# Patient Record
Sex: MALE | Race: WHITE | NOT HISPANIC OR LATINO | Employment: OTHER | URBAN - METROPOLITAN AREA
[De-identification: names, ages, dates, MRNs, and addresses within clinical notes are randomized per-mention and may not be internally consistent; named-entity substitution may affect disease eponyms.]

---

## 2017-05-17 ENCOUNTER — ALLSCRIPTS OFFICE VISIT (OUTPATIENT)
Dept: OTHER | Facility: OTHER | Age: 32
End: 2017-05-17

## 2017-08-17 ENCOUNTER — APPOINTMENT (OUTPATIENT)
Dept: LAB | Facility: CLINIC | Age: 32
End: 2017-08-17
Payer: MEDICARE

## 2017-08-17 ENCOUNTER — ALLSCRIPTS OFFICE VISIT (OUTPATIENT)
Dept: OTHER | Facility: OTHER | Age: 32
End: 2017-08-17

## 2017-08-17 DIAGNOSIS — R73.03 PREDIABETES: ICD-10-CM

## 2017-08-17 DIAGNOSIS — E78.5 HYPERLIPIDEMIA: ICD-10-CM

## 2017-08-17 LAB
ALBUMIN SERPL BCP-MCNC: 3.9 G/DL (ref 3.5–5)
ALP SERPL-CCNC: 70 U/L (ref 46–116)
ALT SERPL W P-5'-P-CCNC: 63 U/L (ref 12–78)
ANION GAP SERPL CALCULATED.3IONS-SCNC: 7 MMOL/L (ref 4–13)
AST SERPL W P-5'-P-CCNC: 35 U/L (ref 5–45)
BILIRUB SERPL-MCNC: 0.73 MG/DL (ref 0.2–1)
BUN SERPL-MCNC: 12 MG/DL (ref 5–25)
CALCIUM SERPL-MCNC: 9.5 MG/DL (ref 8.3–10.1)
CHLORIDE SERPL-SCNC: 104 MMOL/L (ref 100–108)
CHOLEST SERPL-MCNC: 283 MG/DL (ref 50–200)
CO2 SERPL-SCNC: 27 MMOL/L (ref 21–32)
CREAT SERPL-MCNC: 0.92 MG/DL (ref 0.6–1.3)
EST. AVERAGE GLUCOSE BLD GHB EST-MCNC: 117 MG/DL
GFR SERPL CREATININE-BSD FRML MDRD: 108 ML/MIN/1.73SQ M
GLUCOSE P FAST SERPL-MCNC: 89 MG/DL (ref 65–99)
HBA1C MFR BLD: 5.7 % (ref 4.2–6.3)
HDLC SERPL-MCNC: 39 MG/DL (ref 40–60)
LDLC SERPL CALC-MCNC: 176 MG/DL (ref 0–100)
POTASSIUM SERPL-SCNC: 3.9 MMOL/L (ref 3.5–5.3)
PROT SERPL-MCNC: 7.3 G/DL (ref 6.4–8.2)
SODIUM SERPL-SCNC: 138 MMOL/L (ref 136–145)
TRIGL SERPL-MCNC: 342 MG/DL

## 2017-08-17 PROCEDURE — 36415 COLL VENOUS BLD VENIPUNCTURE: CPT

## 2017-08-17 PROCEDURE — 80061 LIPID PANEL: CPT

## 2017-08-17 PROCEDURE — 83036 HEMOGLOBIN GLYCOSYLATED A1C: CPT

## 2017-08-17 PROCEDURE — 80053 COMPREHEN METABOLIC PANEL: CPT

## 2017-08-25 ENCOUNTER — GENERIC CONVERSION - ENCOUNTER (OUTPATIENT)
Dept: OTHER | Facility: OTHER | Age: 32
End: 2017-08-25

## 2017-08-25 DIAGNOSIS — E78.5 HYPERLIPIDEMIA: ICD-10-CM

## 2017-09-20 ENCOUNTER — TRANSCRIBE ORDERS (OUTPATIENT)
Dept: LAB | Facility: CLINIC | Age: 32
End: 2017-09-20

## 2017-09-20 ENCOUNTER — APPOINTMENT (OUTPATIENT)
Dept: LAB | Facility: CLINIC | Age: 32
End: 2017-09-20
Payer: MEDICARE

## 2017-09-20 DIAGNOSIS — E78.5 HYPERLIPIDEMIA: ICD-10-CM

## 2017-09-20 LAB
ALBUMIN SERPL BCP-MCNC: 3.8 G/DL (ref 3.5–5)
ALP SERPL-CCNC: 77 U/L (ref 46–116)
ALT SERPL W P-5'-P-CCNC: 79 U/L (ref 12–78)
ANION GAP SERPL CALCULATED.3IONS-SCNC: 7 MMOL/L (ref 4–13)
AST SERPL W P-5'-P-CCNC: 36 U/L (ref 5–45)
BILIRUB DIRECT SERPL-MCNC: 0.28 MG/DL (ref 0–0.2)
BILIRUB SERPL-MCNC: 1.44 MG/DL (ref 0.2–1)
BUN SERPL-MCNC: 14 MG/DL (ref 5–25)
CALCIUM SERPL-MCNC: 9.4 MG/DL (ref 8.3–10.1)
CHLORIDE SERPL-SCNC: 103 MMOL/L (ref 100–108)
CHOLEST SERPL-MCNC: 177 MG/DL (ref 50–200)
CO2 SERPL-SCNC: 26 MMOL/L (ref 21–32)
CREAT SERPL-MCNC: 0.84 MG/DL (ref 0.6–1.3)
GFR SERPL CREATININE-BSD FRML MDRD: 114 ML/MIN/1.73SQ M
GLUCOSE P FAST SERPL-MCNC: 101 MG/DL (ref 65–99)
HDLC SERPL-MCNC: 44 MG/DL (ref 40–60)
LDLC SERPL CALC-MCNC: 94 MG/DL (ref 0–100)
POTASSIUM SERPL-SCNC: 3.8 MMOL/L (ref 3.5–5.3)
PROT SERPL-MCNC: 7.2 G/DL (ref 6.4–8.2)
SODIUM SERPL-SCNC: 136 MMOL/L (ref 136–145)
TRIGL SERPL-MCNC: 197 MG/DL

## 2017-09-20 PROCEDURE — 80053 COMPREHEN METABOLIC PANEL: CPT

## 2017-09-20 PROCEDURE — 36415 COLL VENOUS BLD VENIPUNCTURE: CPT

## 2017-09-20 PROCEDURE — 80061 LIPID PANEL: CPT

## 2017-09-20 PROCEDURE — 82248 BILIRUBIN DIRECT: CPT

## 2017-10-24 ENCOUNTER — GENERIC CONVERSION - ENCOUNTER (OUTPATIENT)
Dept: OTHER | Facility: OTHER | Age: 32
End: 2017-10-24

## 2017-12-15 ENCOUNTER — GENERIC CONVERSION - ENCOUNTER (OUTPATIENT)
Dept: OTHER | Facility: OTHER | Age: 32
End: 2017-12-15

## 2017-12-15 ENCOUNTER — ALLSCRIPTS OFFICE VISIT (OUTPATIENT)
Dept: OTHER | Facility: OTHER | Age: 32
End: 2017-12-15

## 2017-12-15 DIAGNOSIS — R73.03 PREDIABETES: ICD-10-CM

## 2017-12-15 DIAGNOSIS — E78.5 HYPERLIPIDEMIA: ICD-10-CM
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
## 2018-01-13 VITALS
TEMPERATURE: 99 F | HEART RATE: 99 BPM | WEIGHT: 227.38 LBS | HEIGHT: 70 IN | RESPIRATION RATE: 16 BRPM | OXYGEN SATURATION: 97 % | DIASTOLIC BLOOD PRESSURE: 80 MMHG | SYSTOLIC BLOOD PRESSURE: 110 MMHG | BODY MASS INDEX: 32.55 KG/M2
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
## 2018-01-14 VITALS
RESPIRATION RATE: 16 BRPM | DIASTOLIC BLOOD PRESSURE: 80 MMHG | HEIGHT: 70 IN | HEART RATE: 89 BPM | WEIGHT: 241 LBS | SYSTOLIC BLOOD PRESSURE: 110 MMHG | TEMPERATURE: 97.6 F | OXYGEN SATURATION: 95 % | BODY MASS INDEX: 34.5 KG/M2

## 2018-01-16 NOTE — RESULT NOTES
Discussion/Summary   Discussed results iwth patient's POA/father  Cholesterol is still too high, recommend starting statin  Side effects reviewed  A1c slightly improve dto 5 7 but is still prediabetic  Verified Results  (1) LIPID PANEL FASTING W DIRECT LDL REFLEX 32ECB7493 09:59AM Feng Durbin   TW Order Number: [id number]     Test Name Result Flag Reference   CHOLESTEROL 283 mg/dL H    LDL CHOLESTEROL CALCULATED 176 mg/dL H 0-100   - Patient Instructions: This is a fasting blood test  Water, black tea or black coffee only after 9:00pm the night before test   Drink 2 glasses of water the morning of test       Triglyceride:        Normal ??? ??? ??? ??? ??? ??? ??? <150 mg/dl   ??? ??? ???Borderline High ??? ??? 150-199 mg/dl   ??? ??? ? ?? High ??? ??? ??? ??? ??? ??? ??? 200-499 mg/dl   ??? ??? ? ??Very High ??? ??? ??? ??? ??? >499 mg/dl      Cholesterol:       Desirable ??? ??? ??? ??? <200 mg/dl   ??? ??? Borderline High ??? 200-239 mg/dl   ??? ??? High ??? ??? ??? ??? ??? ??? >239 mg/dl      HDL Cholesterol:       High ??? ???>59 mg/dL   ??? ??? Low ??? ??? <41 mg/dL      HDL Cholesterol:       High ??? ???>59 mg/dL   ??? ??? Low ??? ??? <41 mg/dL      This screening LDL is a calculated result  It does not have the accuracy of the Direct Measured LDL in the monitoring of patients with hyperlipidemia and/or statin therapy  Direct Measure LDL (LFS600) must be ordered separately in these patients  TRIGLYCERIDES 342 mg/dL H <=150   Specimen collection should occur prior to N-Acetylcysteine or Metamizole administration due to the potential for falsely depressed results  HDL,DIRECT 39 mg/dL L 40-60   Specimen collection should occur prior to Metamizole administration due to the potential for falsley depressed results  (1) HEMOGLOBIN A1C 38Tzh3813 09:59AM Feng GRUBBS Order Number: [id number]     Test Name Result Flag Reference   HEMOGLOBIN A1C 5 7 %  4 2-6 3   EST  AVG  GLUCOSE 117 mg/dl       (1) COMPREHENSIVE METABOLIC PANEL 55ABX0483 01:69YV Isa Stuart   TW Order Number: [id number]     Test Name Result Flag Reference   SODIUM 138 mmol/L  136-145   POTASSIUM 3 9 mmol/L  3 5-5 3   CHLORIDE 104 mmol/L  100-108   CARBON DIOXIDE 27 mmol/L  21-32   ANION GAP (CALC) 7 mmol/L  4-13   BLOOD UREA NITROGEN 12 mg/dL  5-25   CREATININE 0 92 mg/dL  0 60-1 30   Standardized to IDMS reference method   CALCIUM 9 5 mg/dL  8 3-10 1   BILI, TOTAL 0 73 mg/dL  0 20-1 00   ALK PHOSPHATAS 70 U/L     ALT (SGPT) 63 U/L  12-78   Specimen collection should occur prior to Sulfasalazine and/or Sulfapyridine administration due to the potential for falsely depressed results  AST(SGOT) 35 U/L  5-45   Specimen collection should occur prior to Sulfasalazine administration due to the potential for falsely depressed results  ALBUMIN 3 9 g/dL  3 5-5 0   TOTAL PROTEIN 7 3 g/dL  6 4-8 2   eGFR 108 ml/min/1 73sq m     [facility] Disease Education Program recommendations are as follows:  GFR calculation is accurate only with a steady state creatinine  Chronic Kidney disease less than 60 ml/min/1 73 sq  meters  Kidney failure less than 15 ml/min/1 73 sq  meters  GLUCOSE FASTING 89 mg/dL  65-99   Specimen collection should occur prior to Sulfasalazine administration due to the potential for falsely depressed results  Specimen collection should occur prior to Sulfapyridine administration due to the potential for falsely elevated results  Plan  Hyperlipidemia    · Rosuvastatin Calcium 10 MG Oral Tablet; Take 1 tablet daily   · (1) COMPREHENSIVE METABOLIC PANEL; Status:Active; Requested for:42Voz4955;    · (1) HEPATIC FUNCTION PANEL; Status:Active; Requested for:25Tzt1463;    · (1) LIPID PANEL FASTING W DIRECT LDL REFLEX; Status:Active;  Requested  for:45Wdk5219;     start rosuvastatin 10mg daily, LFTs in 3 weeks and repeat lipid panel in 4 months prior to next appointment Signatures   Electronically signed by : Braulio Malik DO; Aug 25 2017 10:36AM EST                       (Author)

## 2018-01-22 VITALS
OXYGEN SATURATION: 96 % | TEMPERATURE: 98.1 F | BODY MASS INDEX: 34.65 KG/M2 | DIASTOLIC BLOOD PRESSURE: 80 MMHG | WEIGHT: 242 LBS | SYSTOLIC BLOOD PRESSURE: 112 MMHG | RESPIRATION RATE: 16 BRPM | HEART RATE: 100 BPM | HEIGHT: 70 IN

## 2018-01-22 VITALS
HEART RATE: 90 BPM | BODY MASS INDEX: 35.68 KG/M2 | DIASTOLIC BLOOD PRESSURE: 80 MMHG | OXYGEN SATURATION: 96 % | TEMPERATURE: 97.5 F | SYSTOLIC BLOOD PRESSURE: 110 MMHG | HEIGHT: 70 IN | RESPIRATION RATE: 16 BRPM | WEIGHT: 249.25 LBS
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
## 2018-04-18 ENCOUNTER — APPOINTMENT (OUTPATIENT)
Dept: LAB | Facility: CLINIC | Age: 33
End: 2018-04-18
Payer: MEDICARE

## 2018-04-18 DIAGNOSIS — R73.03 PREDIABETES: ICD-10-CM

## 2018-04-18 DIAGNOSIS — E78.5 HYPERLIPIDEMIA: ICD-10-CM

## 2018-04-18 LAB
ALBUMIN SERPL BCP-MCNC: 4 G/DL (ref 3.5–5)
ALP SERPL-CCNC: 68 U/L (ref 46–116)
ALT SERPL W P-5'-P-CCNC: 60 U/L (ref 12–78)
ANION GAP SERPL CALCULATED.3IONS-SCNC: 7 MMOL/L (ref 4–13)
AST SERPL W P-5'-P-CCNC: 30 U/L (ref 5–45)
BILIRUB SERPL-MCNC: 1.09 MG/DL (ref 0.2–1)
BUN SERPL-MCNC: 11 MG/DL (ref 5–25)
CALCIUM SERPL-MCNC: 9.8 MG/DL (ref 8.3–10.1)
CHLORIDE SERPL-SCNC: 104 MMOL/L (ref 100–108)
CHOLEST SERPL-MCNC: 252 MG/DL (ref 50–200)
CO2 SERPL-SCNC: 28 MMOL/L (ref 21–32)
CREAT SERPL-MCNC: 0.96 MG/DL (ref 0.6–1.3)
GFR SERPL CREATININE-BSD FRML MDRD: 103 ML/MIN/1.73SQ M
GLUCOSE P FAST SERPL-MCNC: 90 MG/DL (ref 65–99)
HDLC SERPL-MCNC: 34 MG/DL (ref 40–60)
LDLC SERPL DIRECT ASSAY-MCNC: 173 MG/DL (ref 0–100)
POTASSIUM SERPL-SCNC: 3.9 MMOL/L (ref 3.5–5.3)
PROT SERPL-MCNC: 7.5 G/DL (ref 6.4–8.2)
SODIUM SERPL-SCNC: 139 MMOL/L (ref 136–145)
TRIGL SERPL-MCNC: 413 MG/DL
TSH SERPL DL<=0.05 MIU/L-ACNC: 2.24 UIU/ML (ref 0.36–3.74)

## 2018-04-18 PROCEDURE — 84443 ASSAY THYROID STIM HORMONE: CPT

## 2018-04-18 PROCEDURE — 36415 COLL VENOUS BLD VENIPUNCTURE: CPT

## 2018-04-18 PROCEDURE — 83721 ASSAY OF BLOOD LIPOPROTEIN: CPT

## 2018-04-18 PROCEDURE — 80053 COMPREHEN METABOLIC PANEL: CPT

## 2018-04-18 PROCEDURE — 80061 LIPID PANEL: CPT

## 2018-04-18 PROCEDURE — 83036 HEMOGLOBIN GLYCOSYLATED A1C: CPT

## 2018-04-19 LAB
EST. AVERAGE GLUCOSE BLD GHB EST-MCNC: 117 MG/DL
HBA1C MFR BLD: 5.7 % (ref 4.2–6.3)

## 2018-04-27 ENCOUNTER — OFFICE VISIT (OUTPATIENT)
Dept: INTERNAL MEDICINE CLINIC | Facility: CLINIC | Age: 33
End: 2018-04-27
Payer: MEDICARE

## 2018-04-27 VITALS
OXYGEN SATURATION: 96 % | RESPIRATION RATE: 16 BRPM | HEIGHT: 70 IN | HEART RATE: 94 BPM | DIASTOLIC BLOOD PRESSURE: 90 MMHG | WEIGHT: 256 LBS | SYSTOLIC BLOOD PRESSURE: 118 MMHG | TEMPERATURE: 97.4 F | BODY MASS INDEX: 36.65 KG/M2

## 2018-04-27 DIAGNOSIS — E78.2 MIXED HYPERLIPIDEMIA: Primary | ICD-10-CM

## 2018-04-27 DIAGNOSIS — R73.03 PREDIABETES: ICD-10-CM

## 2018-04-27 PROBLEM — S06.9X9A TRAUMATIC BRAIN INJURY (HCC): Status: ACTIVE | Noted: 2017-03-02

## 2018-04-27 PROBLEM — S06.9XAA TRAUMATIC BRAIN INJURY (HCC): Status: ACTIVE | Noted: 2017-03-02

## 2018-04-27 PROBLEM — F33.9 MDD (MAJOR DEPRESSIVE DISORDER), RECURRENT EPISODE (HCC): Status: ACTIVE | Noted: 2017-05-17

## 2018-04-27 PROCEDURE — 99213 OFFICE O/P EST LOW 20 MIN: CPT | Performed by: INTERNAL MEDICINE

## 2018-04-27 RX ORDER — ROSUVASTATIN CALCIUM 10 MG/1
1 TABLET, COATED ORAL DAILY
COMMUNITY
Start: 2017-08-25 | End: 2019-05-10 | Stop reason: SDUPTHER

## 2018-04-27 RX ORDER — FLUTICASONE PROPIONATE 50 MCG
2 SPRAY, SUSPENSION (ML) NASAL DAILY
COMMUNITY
Start: 2016-09-21

## 2018-04-27 NOTE — ASSESSMENT & PLAN NOTE
He has been noncompliant with diet and his medications  Stressed importance of taking rosuvastatin 10mg daily and losing weight  Discussed low carb and low fat diet  He walks his dog daily and plans to increase his physical activity more  Recheck levels in 4 months

## 2018-04-27 NOTE — PROGRESS NOTES
Assessment/Plan:    Hyperlipidemia  He has been noncompliant with diet and his medications  Stressed importance of taking rosuvastatin 10mg daily and losing weight  Discussed low carb and low fat diet  He walks his dog daily and plans to increase his physical activity more  Recheck levels in 4 months  Prediabetes  A1c is stable at 5 7  Patient advised to lose weight  1  Mixed hyperlipidemia  Lipid panel   2  Prediabetes  Comprehensive metabolic panel         Subjective:      Patient ID: Felix Flores is a 27 y o  male  30yo male with HLD, MDD, prediabetes and TBI with residual L sided leg weakness here for follow up care  He is accompanied by his father  Hyperlipidemia   This is a chronic problem  The current episode started more than 1 year ago  The problem is uncontrolled  Recent lipid tests were reviewed and are high  Current antihyperlipidemic treatment includes statins  Compliance problems include adherence to diet and adherence to exercise (he has not been taking his rosuvastatin)  Hyperglycemia - no symptoms   This is a chronic problem  The current episode started more than 1 year ago  Treatments tried: he has not monitored his diet or been active  The following portions of the patient's history were reviewed and updated as appropriate: allergies, current medications, past family history, past medical history, past social history, past surgical history and problem list     Current Outpatient Prescriptions:     Cetirizine HCl (ZYRTEC ALLERGY) 10 MG CAPS, Take 1 tablet by mouth, Disp: , Rfl:     fluticasone (FLONASE ALLERGY RELIEF) 50 mcg/act nasal spray, 2 sprays into each nostril daily, Disp: , Rfl:     rosuvastatin (CRESTOR) 10 MG tablet, Take 1 tablet by mouth daily, Disp: , Rfl:     Review of Systems   Respiratory: Negative  Cardiovascular: Negative  Genitourinary: Negative  Neurological: Negative  Psychiatric/Behavioral: Positive for dysphoric mood  Objective:    /90 (BP Location: Left arm, Patient Position: Sitting)   Pulse 94   Temp (!) 97 4 °F (36 3 °C)   Resp 16   Ht 5' 10" (1 778 m)   Wt 116 kg (256 lb)   SpO2 96%   BMI 36 73 kg/m²      Physical Exam   Constitutional: He appears well-developed and well-nourished  No distress  Cardiovascular: Normal rate, regular rhythm and normal heart sounds  Pulmonary/Chest: Effort normal and breath sounds normal    Neurological: He is alert  Vitals reviewed        Recent Results (from the past 672 hour(s))   Lipid Panel with Direct LDL reflex    Collection Time: 04/18/18  9:03 AM   Result Value Ref Range    Cholesterol 252 (H) 50 - 200 mg/dL    Triglycerides 413 (H) <=150 mg/dL    HDL, Direct 34 (L) 40 - 60 mg/dL    LDL Calculated  0 - 100 mg/dL   Comprehensive metabolic panel    Collection Time: 04/18/18  9:03 AM   Result Value Ref Range    Sodium 139 136 - 145 mmol/L    Potassium 3 9 3 5 - 5 3 mmol/L    Chloride 104 100 - 108 mmol/L    CO2 28 21 - 32 mmol/L    Anion Gap 7 4 - 13 mmol/L    BUN 11 5 - 25 mg/dL    Creatinine 0 96 0 60 - 1 30 mg/dL    Glucose, Fasting 90 65 - 99 mg/dL    Calcium 9 8 8 3 - 10 1 mg/dL    AST 30 5 - 45 U/L    ALT 60 12 - 78 U/L    Alkaline Phosphatase 68 46 - 116 U/L    Total Protein 7 5 6 4 - 8 2 g/dL    Albumin 4 0 3 5 - 5 0 g/dL    Total Bilirubin 1 09 (H) 0 20 - 1 00 mg/dL    eGFR 103 ml/min/1 73sq m   Hemoglobin A1c    Collection Time: 04/18/18  9:03 AM   Result Value Ref Range    Hemoglobin A1C 5 7 4 2 - 6 3 %     mg/dl   TSH, 3rd generation with T4 reflex    Collection Time: 04/18/18  9:03 AM   Result Value Ref Range    TSH 3RD GENERATON 2 240 0 358 - 3 740 uIU/mL   LDL cholesterol, direct    Collection Time: 04/18/18  9:03 AM   Result Value Ref Range    LDL Direct 173 (H) 0 - 100 mg/dl

## 2018-05-08 DIAGNOSIS — J30.9 ALLERGIC RHINITIS, UNSPECIFIED SEASONALITY, UNSPECIFIED TRIGGER: Primary | ICD-10-CM

## 2018-08-27 ENCOUNTER — OFFICE VISIT (OUTPATIENT)
Dept: INTERNAL MEDICINE CLINIC | Facility: CLINIC | Age: 33
End: 2018-08-27
Payer: MEDICARE

## 2018-08-27 VITALS
HEIGHT: 70 IN | HEART RATE: 59 BPM | DIASTOLIC BLOOD PRESSURE: 90 MMHG | TEMPERATURE: 97.3 F | WEIGHT: 257.6 LBS | RESPIRATION RATE: 16 BRPM | OXYGEN SATURATION: 95 % | SYSTOLIC BLOOD PRESSURE: 120 MMHG | BODY MASS INDEX: 36.88 KG/M2

## 2018-08-27 DIAGNOSIS — I10 ESSENTIAL HYPERTENSION: ICD-10-CM

## 2018-08-27 DIAGNOSIS — E78.2 MIXED HYPERLIPIDEMIA: Primary | ICD-10-CM

## 2018-08-27 PROCEDURE — 99213 OFFICE O/P EST LOW 20 MIN: CPT | Performed by: INTERNAL MEDICINE

## 2018-08-27 NOTE — PROGRESS NOTES
Assessment/Plan:    Essential hypertension  New, has had multiple readings of diastolic >33  Discussed low sodium diet and losing weight  Will monitor    Hyperlipidemia  Reports compliance on crestor 10mg daily, he is overdue for labs  Encouraged weight loss      1  Mixed hyperlipidemia     2  Essential hypertension         Subjective:      Patient ID: Angel Galvez is a 26 y o  male  29yo male with HLD, prediabetes, MDD and TBI with residual L sided leg weakness here for follow up care  He is accompanied by his son and his father  He has not gone for labs  Hyperlipidemia   This is a chronic problem  The current episode started more than 1 year ago  Condition status: he has not gone for labs  Current antihyperlipidemic treatment includes statins (less snacking, now avoids soda)  Compliance problems include adherence to diet (reports he has been swimming over the summer)  Hypertension   This is a new problem  The current episode started more than 1 month ago  The problem is controlled  Associated symptoms include headaches  Risk factors for coronary artery disease include sedentary lifestyle, family history, obesity, male gender and dyslipidemia  Past treatments include nothing  Compliance problems include diet  The following portions of the patient's history were reviewed and updated as appropriate: allergies, current medications, past family history, past medical history, past social history, past surgical history and problem list     Current Outpatient Prescriptions:     Cetirizine HCl (ZYRTEC ALLERGY) 10 MG CAPS, Take 1 capsule (10 mg total) by mouth daily, Disp: 30 capsule, Rfl: 5    fluticasone (FLONASE ALLERGY RELIEF) 50 mcg/act nasal spray, 2 sprays into each nostril daily, Disp: , Rfl:     rosuvastatin (CRESTOR) 10 MG tablet, Take 1 tablet by mouth daily, Disp: , Rfl:     Review of Systems   Constitutional: Negative for unexpected weight change  HENT: Negative      Respiratory: Negative  Cardiovascular: Negative  Gastrointestinal: Negative  Genitourinary: Negative  Musculoskeletal: Positive for back pain  L Ankle pain   Neurological: Positive for headaches  Negative for dizziness  Psychiatric/Behavioral: Negative for sleep disturbance  Objective:    /90 (BP Location: Left arm, Patient Position: Sitting)   Pulse 59   Temp (!) 97 3 °F (36 3 °C)   Resp 16   Ht 5' 10" (1 778 m)   Wt 117 kg (257 lb 9 6 oz)   SpO2 95%   BMI 36 96 kg/m²      Physical Exam   Constitutional: He is oriented to person, place, and time  He appears well-developed and well-nourished  HENT:   Mouth/Throat: No oropharyngeal exudate  Cardiovascular: Normal rate, regular rhythm, normal heart sounds and intact distal pulses  Pulmonary/Chest: Effort normal and breath sounds normal  No respiratory distress  He has no wheezes  Neurological: He is alert and oriented to person, place, and time  Psychiatric: He has a normal mood and affect  Vitals reviewed

## 2018-08-27 NOTE — ASSESSMENT & PLAN NOTE
New, has had multiple readings of diastolic >40  Discussed low sodium diet and losing weight    Will monitor

## 2019-01-04 ENCOUNTER — OFFICE VISIT (OUTPATIENT)
Dept: INTERNAL MEDICINE CLINIC | Facility: CLINIC | Age: 34
End: 2019-01-04
Payer: MEDICARE

## 2019-01-04 VITALS
RESPIRATION RATE: 16 BRPM | OXYGEN SATURATION: 98 % | DIASTOLIC BLOOD PRESSURE: 80 MMHG | BODY MASS INDEX: 36.36 KG/M2 | TEMPERATURE: 97.5 F | WEIGHT: 254 LBS | HEART RATE: 89 BPM | SYSTOLIC BLOOD PRESSURE: 120 MMHG | HEIGHT: 70 IN

## 2019-01-04 DIAGNOSIS — I10 ESSENTIAL HYPERTENSION: ICD-10-CM

## 2019-01-04 DIAGNOSIS — E78.2 MIXED HYPERLIPIDEMIA: Primary | ICD-10-CM

## 2019-01-04 PROCEDURE — 99213 OFFICE O/P EST LOW 20 MIN: CPT | Performed by: INTERNAL MEDICINE

## 2019-01-04 RX ORDER — INFLUENZA A VIRUS A/MICHIGAN/45/2015 X-275 (H1N1) ANTIGEN (FORMALDEHYDE INACTIVATED), INFLUENZA A VIRUS A/HONG KONG/4801/2014 X-263B (H3N2) ANTIGEN (FORMALDEHYDE INACTIVATED), INFLUENZA B VIRUS B/PHUKET/3073/2013 ANTIGEN (FORMALDEHYDE INACTIVATED), AND INFLUENZA B VIRUS B/BRISBANE/60/2008 ANTIGEN (FORMALDEHYDE INACTIVATED) 15; 15; 15; 15 UG/.5ML; UG/.5ML; UG/.5ML; UG/.5ML
INJECTION, SUSPENSION INTRAMUSCULAR
Refills: 0 | COMMUNITY
Start: 2018-11-05 | End: 2019-10-15

## 2019-01-04 NOTE — ASSESSMENT & PLAN NOTE
bp improved  Encouraged to lose weight with dietary modifications initially  He is not motivated to make changes however

## 2019-01-04 NOTE — PROGRESS NOTES
Assessment/Plan:    Essential hypertension  bp improved  Encouraged to lose weight with dietary modifications initially  He is not motivated to make changes however  Hyperlipidemia  He is tolerating rosuvastatin 10mg daily  He is overdue for labs and scripts were reprinted today  1  Mixed hyperlipidemia     2  Essential hypertension         Subjective:      Patient ID: Montez Alicea is a 26 y o  male  64-year-old male with HLD, HTN, prediabetes, MDD and TBI with residual left-sided leg weakness here for follow-up care  He is accompanied by his father  He has not gone for labs  Hypertension   This is a chronic problem  The current episode started more than 1 year ago  The problem is controlled  Associated symptoms include headaches  Risk factors for coronary artery disease include obesity and dyslipidemia  Compliance problems include diet  Hyperlipidemia   This is a chronic problem  The current episode started more than 1 year ago  Current antihyperlipidemic treatment includes statins  There are no compliance problems  The following portions of the patient's history were reviewed and updated as appropriate: allergies, current medications, past family history, past medical history, past social history, past surgical history and problem list     Current Outpatient Prescriptions:     rosuvastatin (CRESTOR) 10 MG tablet, Take 1 tablet by mouth daily, Disp: , Rfl:     Cetirizine HCl (ZYRTEC ALLERGY) 10 MG CAPS, Take 1 capsule (10 mg total) by mouth daily (Patient not taking: Reported on 1/4/2019 ), Disp: 30 capsule, Rfl: 5    fluticasone (FLONASE ALLERGY RELIEF) 50 mcg/act nasal spray, 2 sprays into each nostril daily, Disp: , Rfl:     FLUZONE QUADRIVALENT 0 5 ML SILVESTRE, TO BE ADMINISTERED BY PHARMACIST FOR IMMUNIZATION, Disp: , Rfl: 0      Review of Systems   Constitutional: Negative  HENT: Negative  Respiratory: Negative  Cardiovascular: Negative  Gastrointestinal: Negative  Genitourinary: Negative  Musculoskeletal: Positive for back pain  L Ankle pain   Neurological: Positive for headaches  Negative for dizziness  Psychiatric/Behavioral: Negative for sleep disturbance  Objective:    /80 (BP Location: Left arm, Patient Position: Sitting)   Pulse 89   Temp 97 5 °F (36 4 °C)   Resp 16   Ht 5' 10" (1 778 m)   Wt 115 kg (254 lb)   SpO2 98%   BMI 36 45 kg/m²      Physical Exam   Constitutional: He appears well-developed and well-nourished  HENT:   Mouth/Throat: No oropharyngeal exudate  Neck: Neck supple  Cardiovascular: Normal rate, regular rhythm, normal heart sounds and intact distal pulses  Pulmonary/Chest: Effort normal and breath sounds normal  No respiratory distress  He has no wheezes  Musculoskeletal:   Wears L ankle brace   Neurological: He is alert  Psychiatric: He has a normal mood and affect  Vitals reviewed

## 2019-01-09 ENCOUNTER — APPOINTMENT (OUTPATIENT)
Dept: LAB | Facility: CLINIC | Age: 34
End: 2019-01-09
Payer: MEDICARE

## 2019-01-09 DIAGNOSIS — E78.2 MIXED HYPERLIPIDEMIA: ICD-10-CM

## 2019-01-09 DIAGNOSIS — R73.03 PREDIABETES: ICD-10-CM

## 2019-01-09 LAB
ALBUMIN SERPL BCP-MCNC: 4.2 G/DL (ref 3.5–5)
ALP SERPL-CCNC: 69 U/L (ref 46–116)
ALT SERPL W P-5'-P-CCNC: 57 U/L (ref 12–78)
ANION GAP SERPL CALCULATED.3IONS-SCNC: 6 MMOL/L (ref 4–13)
AST SERPL W P-5'-P-CCNC: 25 U/L (ref 5–45)
BILIRUB SERPL-MCNC: 1.15 MG/DL (ref 0.2–1)
BUN SERPL-MCNC: 13 MG/DL (ref 5–25)
CALCIUM SERPL-MCNC: 9.2 MG/DL (ref 8.3–10.1)
CHLORIDE SERPL-SCNC: 105 MMOL/L (ref 100–108)
CHOLEST SERPL-MCNC: 229 MG/DL (ref 50–200)
CO2 SERPL-SCNC: 27 MMOL/L (ref 21–32)
CREAT SERPL-MCNC: 1.01 MG/DL (ref 0.6–1.3)
GFR SERPL CREATININE-BSD FRML MDRD: 96 ML/MIN/1.73SQ M
GLUCOSE P FAST SERPL-MCNC: 94 MG/DL (ref 65–99)
HDLC SERPL-MCNC: 39 MG/DL (ref 40–60)
LDLC SERPL CALC-MCNC: 150 MG/DL (ref 0–100)
NONHDLC SERPL-MCNC: 190 MG/DL
POTASSIUM SERPL-SCNC: 3.8 MMOL/L (ref 3.5–5.3)
PROT SERPL-MCNC: 7.5 G/DL (ref 6.4–8.2)
SODIUM SERPL-SCNC: 138 MMOL/L (ref 136–145)
TRIGL SERPL-MCNC: 202 MG/DL

## 2019-01-09 PROCEDURE — 80061 LIPID PANEL: CPT

## 2019-01-09 PROCEDURE — 36415 COLL VENOUS BLD VENIPUNCTURE: CPT

## 2019-01-09 PROCEDURE — 80053 COMPREHEN METABOLIC PANEL: CPT

## 2019-05-10 ENCOUNTER — OFFICE VISIT (OUTPATIENT)
Dept: INTERNAL MEDICINE CLINIC | Facility: CLINIC | Age: 34
End: 2019-05-10
Payer: MEDICARE

## 2019-05-10 VITALS
RESPIRATION RATE: 16 BRPM | DIASTOLIC BLOOD PRESSURE: 80 MMHG | HEIGHT: 70 IN | SYSTOLIC BLOOD PRESSURE: 118 MMHG | BODY MASS INDEX: 35.79 KG/M2 | WEIGHT: 250 LBS | OXYGEN SATURATION: 98 % | HEART RATE: 75 BPM | TEMPERATURE: 97.8 F

## 2019-05-10 DIAGNOSIS — Z00.00 MEDICARE ANNUAL WELLNESS VISIT, SUBSEQUENT: ICD-10-CM

## 2019-05-10 DIAGNOSIS — E66.01 SEVERE OBESITY (BMI 35.0-39.9) WITH COMORBIDITY (HCC): ICD-10-CM

## 2019-05-10 DIAGNOSIS — R73.03 PREDIABETES: ICD-10-CM

## 2019-05-10 DIAGNOSIS — I10 ESSENTIAL HYPERTENSION: ICD-10-CM

## 2019-05-10 DIAGNOSIS — J30.2 SEASONAL ALLERGIC RHINITIS, UNSPECIFIED TRIGGER: ICD-10-CM

## 2019-05-10 DIAGNOSIS — E78.2 MIXED HYPERLIPIDEMIA: Primary | ICD-10-CM

## 2019-05-10 PROCEDURE — 99214 OFFICE O/P EST MOD 30 MIN: CPT | Performed by: INTERNAL MEDICINE

## 2019-05-10 PROCEDURE — G0439 PPPS, SUBSEQ VISIT: HCPCS | Performed by: INTERNAL MEDICINE

## 2019-05-10 RX ORDER — ROSUVASTATIN CALCIUM 10 MG/1
10 TABLET, COATED ORAL DAILY
Qty: 90 TABLET | Refills: 1 | Status: SHIPPED | OUTPATIENT
Start: 2019-05-10

## 2019-09-13 ENCOUNTER — OFFICE VISIT (OUTPATIENT)
Dept: INTERNAL MEDICINE CLINIC | Facility: CLINIC | Age: 34
End: 2019-09-13
Payer: MEDICARE

## 2019-09-13 VITALS
WEIGHT: 255 LBS | HEIGHT: 70 IN | OXYGEN SATURATION: 98 % | SYSTOLIC BLOOD PRESSURE: 112 MMHG | RESPIRATION RATE: 16 BRPM | HEART RATE: 94 BPM | BODY MASS INDEX: 36.51 KG/M2 | DIASTOLIC BLOOD PRESSURE: 80 MMHG | TEMPERATURE: 98.5 F

## 2019-09-13 DIAGNOSIS — J01.90 ACUTE RHINOSINUSITIS: Primary | ICD-10-CM

## 2019-09-13 PROCEDURE — 99213 OFFICE O/P EST LOW 20 MIN: CPT | Performed by: INTERNAL MEDICINE

## 2019-09-13 NOTE — ASSESSMENT & PLAN NOTE
Patient advised to start flonase nasal spray and oral antihistamine  If no improvement then will consider abx next week

## 2019-09-13 NOTE — PROGRESS NOTES
Assessment/Plan:    Problem List Items Addressed This Visit        Respiratory    Acute rhinosinusitis - Primary     Patient advised to start flonase nasal spray and oral antihistamine  If no improvement then will consider abx next week  Subjective:      Patient ID: Sascha Berry is a 37 y o  male  HPI  42yo male with HLD, MDD, prediabetes, HTN, allergic rhinitis here for evaluation of cold symptoms  He is accompanied by his two sons and his father  He reports dizziness, head congestion, HA, sore throat, nonproductive cough that began 3 days ago  Reports his sons have had same symptoms  He also has been sneezing, Denies n/v/d, fever or chills  He has taken ibuprofen and cloraseptic spray  Reports symptoms feel about the same since they started  He is a former smoker  The following portions of the patient's history were reviewed and updated as appropriate: allergies, current medications, past family history, past medical history, past social history, past surgical history and problem list     Current Outpatient Medications:     Cetirizine HCl (ZYRTEC ALLERGY) 10 MG CAPS, Take 1 capsule (10 mg total) by mouth daily, Disp: 30 capsule, Rfl: 5    rosuvastatin (CRESTOR) 10 MG tablet, Take 1 tablet (10 mg total) by mouth daily, Disp: 90 tablet, Rfl: 1    fluticasone (FLONASE ALLERGY RELIEF) 50 mcg/act nasal spray, 2 sprays into each nostril daily, Disp: , Rfl:     FLUZONE QUADRIVALENT 0 5 ML SILVESTRE, TO BE ADMINISTERED BY PHARMACIST FOR IMMUNIZATION, Disp: , Rfl: 0    Review of Systems   Constitutional: Negative for fever  HENT: Positive for congestion, postnasal drip, rhinorrhea, sinus pressure, sneezing and sore throat  Respiratory: Positive for cough  Negative for shortness of breath and wheezing  Gastrointestinal: Negative  Neurological: Positive for dizziness and headaches         Objective:    /80 (BP Location: Left arm, Patient Position: Sitting)   Pulse 94   Temp 98 5 °F (36 9 °C)   Resp 16   Ht 5' 10" (1 778 m)   Wt 116 kg (255 lb)   SpO2 98%   BMI 36 59 kg/m²      Physical Exam   Constitutional: He appears well-developed  No distress  HENT:   Right Ear: External ear normal    Left Ear: External ear normal    Nose: Mucosal edema and rhinorrhea present  Mouth/Throat: Oropharynx is clear and moist  No oropharyngeal exudate  Neck: Neck supple  Cardiovascular: Normal rate, regular rhythm and normal heart sounds  Pulmonary/Chest: Effort normal and breath sounds normal  No stridor  No respiratory distress  Neurological: He is alert  Vitals reviewed

## 2019-10-15 ENCOUNTER — TRANSCRIBE ORDERS (OUTPATIENT)
Dept: RADIOLOGY | Facility: HOSPITAL | Age: 34
End: 2019-10-15

## 2019-10-15 ENCOUNTER — OFFICE VISIT (OUTPATIENT)
Dept: INTERNAL MEDICINE CLINIC | Facility: CLINIC | Age: 34
End: 2019-10-15
Payer: MEDICARE

## 2019-10-15 ENCOUNTER — TELEPHONE (OUTPATIENT)
Dept: INTERNAL MEDICINE CLINIC | Facility: CLINIC | Age: 34
End: 2019-10-15

## 2019-10-15 ENCOUNTER — TELEPHONE (OUTPATIENT)
Dept: OTHER | Facility: OTHER | Age: 34
End: 2019-10-15

## 2019-10-15 ENCOUNTER — HOSPITAL ENCOUNTER (OUTPATIENT)
Dept: RADIOLOGY | Facility: HOSPITAL | Age: 34
Discharge: HOME | End: 2019-10-15
Payer: MEDICARE

## 2019-10-15 VITALS
TEMPERATURE: 100.7 F | OXYGEN SATURATION: 97 % | SYSTOLIC BLOOD PRESSURE: 122 MMHG | DIASTOLIC BLOOD PRESSURE: 78 MMHG | RESPIRATION RATE: 16 BRPM | WEIGHT: 255.2 LBS | BODY MASS INDEX: 37.8 KG/M2 | HEART RATE: 113 BPM | HEIGHT: 69 IN

## 2019-10-15 DIAGNOSIS — J18.9 COMMUNITY ACQUIRED PNEUMONIA OF RIGHT LOWER LOBE OF LUNG: Primary | ICD-10-CM

## 2019-10-15 DIAGNOSIS — R50.9 FEVER IN ADULT: ICD-10-CM

## 2019-10-15 DIAGNOSIS — R50.9 FEVER IN ADULT: Primary | ICD-10-CM

## 2019-10-15 PROBLEM — J01.90 ACUTE RHINOSINUSITIS: Status: RESOLVED | Noted: 2019-09-13 | Resolved: 2019-10-15

## 2019-10-15 LAB — S PYO AG THROAT QL: NEGATIVE

## 2019-10-15 PROCEDURE — 87880 STREP A ASSAY W/OPTIC: CPT | Performed by: INTERNAL MEDICINE

## 2019-10-15 PROCEDURE — 71046 X-RAY EXAM CHEST 2 VIEWS: CPT

## 2019-10-15 PROCEDURE — 99214 OFFICE O/P EST MOD 30 MIN: CPT | Performed by: INTERNAL MEDICINE

## 2019-10-15 RX ORDER — AZITHROMYCIN 250 MG/1
TABLET, FILM COATED ORAL
Qty: 6 TABLET | Refills: 0 | Status: SHIPPED | OUTPATIENT
Start: 2019-10-15 | End: 2019-10-20

## 2019-10-15 RX ORDER — CEFDINIR 300 MG/1
300 CAPSULE ORAL EVERY 12 HOURS SCHEDULED
Qty: 14 CAPSULE | Refills: 0 | Status: SHIPPED | OUTPATIENT
Start: 2019-10-15 | End: 2019-10-22

## 2019-10-15 NOTE — ASSESSMENT & PLAN NOTE
Strep negative  Obtain CXR, if no PNA then likely influenza  Influenza PCR not done due to cost for patient  He is outside of time frame for treatment for antiviral   Advised to alternate between tylenol and ibuprofen to reduce his fever and increase fluid intake  Will call with results

## 2019-10-15 NOTE — TELEPHONE ENCOUNTER
CXR results discussed with patient's father  Will place on azithromycin and cefdinir 300mg q12 x7days for treatment of CAP  Patient to have repeat CXR in 6 weeks to check for resolution

## 2019-10-15 NOTE — TELEPHONE ENCOUNTER
Health Call  Patient Name: Harris Cantor  Gender: Male  : 1985  Age: 37 Y 3 M 25 D  Return Phone  Number: [phone] (Home)  Address: [address] [place] [address]/[facility]/Zip: [address] [place] [address]  Practice Name: Faheem Knapp [address]  [address] [facility]  Practice Charged:  Physician:  Caller Name: Clif Guerra  Relationship To  Patient: Father  Return Phone Number: [phone] (Home)  Presenting Problem: "My grown disabled son was diagnosed with pneumonia and I need  to know if my grandchildren should be  sent to school tomorrow "  Service Type: Triage  Charged Service 1: N/A  Pharmacy Name and  Number:  Nurse Assessment  Protocols  Protocol Title Nurse Date/Time  Pneumonia on Antibiotic Post-Hospitalization  Follow-up Call  TimerMAKENZIE Brookie Bush 10/15/2019 6:52:02 PM  Question Caller Affirmed  Disp  Time Disposition Final User  10/15/2019 5:49:21 PM Send to Follow Up Queue TimeMAKENZIE may Brookie Bush  10/15/2019 6:53:02 PM Home Care TimerMAKENZIE Brookie Bush  10/15/2019 6:53:17 PM RN Triaged Yes TimerMAKENZIE, [facility] Advice Given Per Protocol  HOME CARE: You should be able to treat this at home  PNEUMONIA - GENERAL INFORMATION: * DEFINITION: Pneumonia  is an infection of the lung  It is sometimes described as a lower respiratory infection  * SYMPTOMS: The most common symptoms  are cough, fever, and breathing difficulty  Other symptoms may include chest pain, feeling sick (malaise), weakness, fatigue, muscle  aches, headache, and nausea  Symptoms of pneumonia in the elderly may be more subtle and there may not be any obvious respiratory  symptoms  For example, an elderly person with pneumonia might only have weakness and a low-grade fever  * CAUSE: Most cases  of pneumonia in adults are caused by bacteria  Pneumonia can also be caused by viruses, fungi, and tuberculosis  * DIAGNOSIS:  In a hospitalized patient the diagnosis of pneumonia is generally made using a combination of a chest radiograph and the patient's clinical symptoms   The chest Xray of a patient with pneumonia will show an infiltrate (cloudiness or haziness) in the area of the  pneumonia  A CT scan of the chest is even more sensitive for a small pneumonia than a chest Xray  * TREATMENT: Since bacteria  are the most common cause of pneumonia in adults, antibiotics are the most important treatment  Symptoms of fever can be treated  with acetaminophen (e g , Tylenol), ibuprofen (e g , Advil, Motrin), or naproxen (e g , Aleve)  * EXPECTED COURSE: With antibiotic  treatment the fever should go away within a couple days  Other symptoms take longer to resolve  Cough and weakness can sometimes  last a couple weeks  PNEUMONIA - CONTAGIOUSNESS: * Unlike the common cold which can be spread quite easily, bacterial  pneumonia is usually not contagious  * You can return to work or school after the fever is gone and you feel well enough to do normal  activities  CALL BACK IF: * You have more questions or concerns * You become worse  CARE ADVICE given per Pneumonia on  Antibiotic Post-Hospitalization Follow-Up Call (Adult) guideline  Caller Understands: Yes  Caller Disagree/Comply: Comply  PreDisposition: Unsure  Comments  User: Marcelo Hook RN Date/Time: 10/15/2019 5:48:45 PM  Two back to back attempts to reach pt's father to discuss concerns  Call picked up, but no one responded to greeting  Will try again  in 15-20 min

## 2019-10-15 NOTE — PROGRESS NOTES
Assessment/Plan:    Problem List Items Addressed This Visit        Other    Fever in adult - Primary     Strep negative  Obtain CXR, if no PNA then likely influenza  Influenza PCR not done due to cost for patient  He is outside of time frame for treatment for antiviral   Advised to alternate between tylenol and ibuprofen to reduce his fever and increase fluid intake  Will call with results  Relevant Orders    POCT rapid strepA (Completed)    XR chest pa & lateral        Subjective:      Patient ID: Rufina Pederson is a 37 y o  male  HPI  12-year-old male with MDD, prediabetes, HTN, allergic rhinitis, HLD and TBI with residual left-sided leg weakness here for evaluation of fever  He is accompanied by his dad and his son  Patient reports HA, sore throat, dizziness, occasional productive cough, Denies nasal congestion, n/v/d, myalgias  Symptoms began four days ago, suddenly  His son has had same symptoms but patient had symptoms first   He has been taking cesar-seltzer cold and flu with minimal change  The following portions of the patient's history were reviewed and updated as appropriate: allergies, current medications, past family history, past medical history, past social history, past surgical history and problem list     Current Outpatient Medications:     rosuvastatin (CRESTOR) 10 MG tablet, Take 1 tablet (10 mg total) by mouth daily, Disp: 90 tablet, Rfl: 1    Cetirizine HCl (ZYRTEC ALLERGY) 10 MG CAPS, Take 1 capsule (10 mg total) by mouth daily (Patient not taking: Reported on 10/15/2019), Disp: 30 capsule, Rfl: 5    fluticasone (FLONASE ALLERGY RELIEF) 50 mcg/act nasal spray, 2 sprays into each nostril daily, Disp: , Rfl:     Review of Systems   Constitutional: Positive for fever  Negative for appetite change  HENT: Positive for sneezing and sore throat  Negative for congestion, postnasal drip and rhinorrhea  Respiratory: Positive for cough and shortness of breath   Negative for wheezing  Gastrointestinal: Negative for nausea and vomiting  Neurological: Positive for headaches  Objective:    /78 (BP Location: Left arm, Patient Position: Sitting)   Pulse (!) 113   Temp (!) 100 7 °F (38 2 °C) (Tympanic)   Resp 16   Ht 5' 9" (1 753 m)   Wt 116 kg (255 lb 3 2 oz)   SpO2 97%   BMI 37 69 kg/m²      Physical Exam   Constitutional: No distress  HENT:   Right Ear: External ear normal    Left Ear: External ear normal    Nose: Rhinorrhea present  Mouth/Throat: No oropharyngeal exudate  Cardiovascular: Normal heart sounds  Tachycardia present  Pulmonary/Chest: Effort normal and breath sounds normal  No stridor  No respiratory distress  Lymphadenopathy:     He has no cervical adenopathy  Neurological: He is alert  Vitals reviewed

## 2020-09-16 ENCOUNTER — IMMUNIZATIONS (OUTPATIENT)
Dept: INTERNAL MEDICINE CLINIC | Facility: CLINIC | Age: 35
End: 2020-09-16
Payer: MEDICARE

## 2020-09-16 DIAGNOSIS — Z23 ENCOUNTER FOR IMMUNIZATION: Primary | ICD-10-CM

## 2020-09-16 PROCEDURE — G0008 ADMIN INFLUENZA VIRUS VAC: HCPCS

## 2020-09-16 PROCEDURE — 90686 IIV4 VACC NO PRSV 0.5 ML IM: CPT

## 2021-04-16 ENCOUNTER — IMMUNIZATIONS (OUTPATIENT)
Dept: FAMILY MEDICINE CLINIC | Facility: HOSPITAL | Age: 36
End: 2021-04-16

## 2021-04-16 DIAGNOSIS — Z23 ENCOUNTER FOR IMMUNIZATION: Primary | ICD-10-CM

## 2021-04-16 PROCEDURE — 0001A SARS-COV-2 / COVID-19 MRNA VACCINE (PFIZER-BIONTECH) 30 MCG: CPT

## 2021-04-16 PROCEDURE — 91300 SARS-COV-2 / COVID-19 MRNA VACCINE (PFIZER-BIONTECH) 30 MCG: CPT

## 2021-05-11 ENCOUNTER — IMMUNIZATIONS (OUTPATIENT)
Dept: FAMILY MEDICINE CLINIC | Facility: HOSPITAL | Age: 36
End: 2021-05-11

## 2021-05-11 DIAGNOSIS — Z23 ENCOUNTER FOR IMMUNIZATION: Primary | ICD-10-CM

## 2021-05-11 PROCEDURE — 91300 SARS-COV-2 / COVID-19 MRNA VACCINE (PFIZER-BIONTECH) 30 MCG: CPT

## 2021-05-11 PROCEDURE — 0002A SARS-COV-2 / COVID-19 MRNA VACCINE (PFIZER-BIONTECH) 30 MCG: CPT

## 2021-09-16 ENCOUNTER — IMMUNIZATIONS (OUTPATIENT)
Dept: INTERNAL MEDICINE CLINIC | Facility: CLINIC | Age: 36
End: 2021-09-16
Payer: MEDICARE

## 2021-09-16 DIAGNOSIS — Z23 ENCOUNTER FOR IMMUNIZATION: Primary | ICD-10-CM

## 2021-09-16 PROCEDURE — 90686 IIV4 VACC NO PRSV 0.5 ML IM: CPT | Performed by: INTERNAL MEDICINE

## 2021-09-16 PROCEDURE — G0008 ADMIN INFLUENZA VIRUS VAC: HCPCS | Performed by: INTERNAL MEDICINE

## 2023-08-26 ENCOUNTER — HOSPITAL ENCOUNTER (EMERGENCY)
Facility: HOSPITAL | Age: 38
Discharge: HOME | End: 2023-08-26
Attending: EMERGENCY MEDICINE | Admitting: EMERGENCY MEDICINE
Payer: MEDICARE

## 2023-08-26 ENCOUNTER — APPOINTMENT (EMERGENCY)
Dept: RADIOLOGY | Facility: HOSPITAL | Age: 38
End: 2023-08-26
Payer: MEDICARE

## 2023-08-26 VITALS
DIASTOLIC BLOOD PRESSURE: 97 MMHG | RESPIRATION RATE: 22 BRPM | TEMPERATURE: 97.9 F | SYSTOLIC BLOOD PRESSURE: 154 MMHG | HEART RATE: 89 BPM | OXYGEN SATURATION: 95 %

## 2023-08-26 DIAGNOSIS — M54.50 ACUTE BILATERAL LOW BACK PAIN WITHOUT SCIATICA: Primary | ICD-10-CM

## 2023-08-26 PROCEDURE — 99283 EMERGENCY DEPT VISIT LOW MDM: CPT

## 2023-08-26 PROCEDURE — 99284 EMERGENCY DEPT VISIT MOD MDM: CPT | Performed by: EMERGENCY MEDICINE

## 2023-08-26 PROCEDURE — 72100 X-RAY EXAM L-S SPINE 2/3 VWS: CPT

## 2023-08-26 RX ORDER — ACETAMINOPHEN 325 MG/1
650 TABLET ORAL ONCE
Status: COMPLETED | OUTPATIENT
Start: 2023-08-26 | End: 2023-08-26

## 2023-08-26 RX ORDER — LIDOCAINE 50 MG/G
1 PATCH TOPICAL ONCE
Status: DISCONTINUED | OUTPATIENT
Start: 2023-08-26 | End: 2023-08-26 | Stop reason: HOSPADM

## 2023-08-26 RX ORDER — IBUPROFEN 400 MG/1
400 TABLET ORAL ONCE
Status: COMPLETED | OUTPATIENT
Start: 2023-08-26 | End: 2023-08-26

## 2023-08-26 RX ADMIN — LIDOCAINE 5% 1 PATCH: 700 PATCH TOPICAL at 14:29

## 2023-08-26 RX ADMIN — ACETAMINOPHEN 650 MG: 325 TABLET, FILM COATED ORAL at 14:29

## 2023-08-26 RX ADMIN — IBUPROFEN 400 MG: 400 TABLET, FILM COATED ORAL at 14:29
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
## 2023-08-26 NOTE — DISCHARGE INSTRUCTIONS
Return to ED if you develop weakness, numbness, urinary or bladder incontinence, fever, chills. You can take Tylenol and Motrin for your pain.   Follow-up with your primary care doctor

## 2023-08-26 NOTE — ED ATTENDING ATTESTATION
8/26/2023  IMaria De Jesus DO, saw and evaluated the patient. I have discussed the patient with the resident/non-physician practitioner and agree with the resident's/non-physician practitioner's findings, Plan of Care, and MDM as documented in the resident's/non-physician practitioner's note, except where noted. All available labs and Radiology studies were reviewed. I was present for key portions of any procedure(s) performed by the resident/non-physician practitioner and I was immediately available to provide assistance. At this point I agree with the current assessment done in the Emergency Department. I have conducted an independent evaluation of this patient a history and physical is as follows:    Patient is a 42-year-old male with history of hypertension, hyperlipidemia, previous traumatic brain injury with leaving with slight left sided clonus, accompanied by his father. The patient says he was bowling 2 nights ago as he bowls frequently, then yesterday morning woke up and noticed some bilateral low back discomfort, worse with twisting and bending and better with remaining still. No falls or direct trauma. No bladder or bowel incontinence, no saddle anesthesia, no leg weakness, no IV drug use, no fever, no chills. No dysuria or hematuria. Says he normally has some slight difficulty ambulating due to his left leg clonus and traumatic brain injury but that is unchanged. He has never had this count of pain before. Yesterday he tried taking 2 Tylenol and using IcyHot which did not give him significant relief.     General:  Patient is well-appearing  Head:  Atraumatic  Eyes:  Conjunctiva pink  ENT:  Mucous membranes are moist  Neck:  Supple  Cardiac:  S1-S2, without murmurs  Lungs:  Clear to auscultation bilaterally  Abdomen:  Soft, nontender, normal bowel sounds, no CVA tenderness  Extremities: Patient has slight clonus in his left leg, he has painless range of motion at the bilateral hips, knees, ankles. No warmth or redness to either leg. Normal sensation throughout both legs, DP and PT pulses are intact. Back: No warmth or redness to the back. There is no CVA tenderness, no spinal tenderness, no warmth or fluctuance. Neurologic:  Awake, fluent speech, normal comprehension, sensation intact and symmetric in the b/l  legs. Strength is 5/5 at the bilateral hips, knees, ankles, reflexes are 2/4 at the bilateral knees and ankles. Can dorsiflex & plantarflex great toes bilaterally without difficulty, no saddle anesthesia, negative straight leg raise bilaterally. AAOx3  Skin:  Pink warm and dry, no rash  Psychiatric:  Alert, pleasant, cooperative        ED Course     XR spine lumbar 2 or 3 views injury   ED Interpretation   Lumbar spine x-ray interpreted by me shows no fracture, no acute osseous abnormality        On ED resident examination patient had some slight lumbar spine tenderness which is not present on my examination. No warmth or redness surrounding the spine. Based on the patient's history and physical exam findings, I do not find any evidence of neurosurgical emergency or need for emergent imaging studies as there is no bladder or bowel incontinence, no saddle anesthesia, and no significant neurologic abnormalities. There is no evidence of cauda equina syndrome, The patient is afebrile, has no significant vertebral tenderness or fluctuance, and has no risk factors for spinal abscess such as IV drug use, significant trauma, or recent spinal injections. I believe it is appropriate for the patient to be discharged and managed conservatively as an outpatient. It was discussed with the patient that this may be the early presentation of a more significant problem, and if they notice any of the signs/symptoms in the discharge instruction sheets, or they are otherwise concerned about their medical condition, they should return to the nearest emergency department.      Supportive care, importance of follow-up and return precautions were discussed with the patient, who expressed understanding. DIAGNOSIS:  Acute nonspecific low back pain    MEDICAL DECISION MAKING CODING      Chronic conditions affecting care: As per HPI    COLLECTION AND INTERPRETATION OF DATA  Additional history obtained from: Father  I reviewed prior external notes, including May 10, 2019 PCP office visit      I ordered each unique test  Tests reviewed personally by me:  Imaging: I independently reviewed the lumbar spine x-ray as noted above    Tests considered but not ordered: See above    RISK  Drugs (OTC, Rx, Controlled substances): Prescription management  All of the patient's current prescription medications should be continued.       Surgery  -I considered surgery may be necessary prior to completion of the work up but afterwards there is no indication for immediate surgery    Social Determinants of Health:  Presentation to ED outside of business hours or on night shift        Critical Care Time  Procedures

## 2024-02-21 PROBLEM — J18.9 COMMUNITY ACQUIRED PNEUMONIA OF RIGHT LOWER LOBE OF LUNG: Status: RESOLVED | Noted: 2019-10-15 | Resolved: 2024-02-21

## 2024-02-21 PROBLEM — R50.9 FEVER IN ADULT: Status: RESOLVED | Noted: 2019-10-15 | Resolved: 2024-02-21
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
## 2024-10-22 ENCOUNTER — OFFICE VISIT (OUTPATIENT)
Age: 39
End: 2024-10-22
Payer: MEDICARE

## 2024-10-22 VITALS
WEIGHT: 253 LBS | BODY MASS INDEX: 36.22 KG/M2 | SYSTOLIC BLOOD PRESSURE: 140 MMHG | DIASTOLIC BLOOD PRESSURE: 90 MMHG | HEIGHT: 70 IN

## 2024-10-22 DIAGNOSIS — E78.2 MIXED HYPERLIPIDEMIA: ICD-10-CM

## 2024-10-22 DIAGNOSIS — G44.209 TENSION HEADACHE: ICD-10-CM

## 2024-10-22 DIAGNOSIS — S06.9XAD TRAUMATIC BRAIN INJURY, WITH UNKNOWN LOSS OF CONSCIOUSNESS STATUS, SUBSEQUENT ENCOUNTER: Primary | ICD-10-CM

## 2024-10-22 DIAGNOSIS — I10 ESSENTIAL HYPERTENSION: ICD-10-CM

## 2024-10-22 DIAGNOSIS — Z13.1 SCREENING FOR DIABETES MELLITUS: ICD-10-CM

## 2024-10-22 DIAGNOSIS — Z11.4 SCREENING FOR HIV (HUMAN IMMUNODEFICIENCY VIRUS): ICD-10-CM

## 2024-10-22 DIAGNOSIS — E66.01 SEVERE OBESITY (BMI 35.0-39.9) WITH COMORBIDITY (HCC): ICD-10-CM

## 2024-10-22 DIAGNOSIS — Z11.59 NEED FOR HEPATITIS C SCREENING TEST: ICD-10-CM

## 2024-10-22 DIAGNOSIS — R05.2 SUBACUTE COUGH: ICD-10-CM

## 2024-10-22 PROCEDURE — 99204 OFFICE O/P NEW MOD 45 MIN: CPT | Performed by: STUDENT IN AN ORGANIZED HEALTH CARE EDUCATION/TRAINING PROGRAM

## 2024-10-22 RX ORDER — DEXTROMETHORPHAN HYDROBROMIDE AND PROMETHAZINE HYDROCHLORIDE 15; 6.25 MG/5ML; MG/5ML
5 SYRUP ORAL 4 TIMES DAILY PRN
Qty: 240 ML | Refills: 1 | Status: SHIPPED | OUTPATIENT
Start: 2024-10-22

## 2024-10-22 NOTE — ASSESSMENT & PLAN NOTE
Patient has severe obesity as evidenced by a BMI of 36.30 today.  Discussed dietary and lifestyle modifications with patient.

## 2024-10-22 NOTE — ASSESSMENT & PLAN NOTE
Per chart review patient has history of hypertension.  He is not currently taking any antihypertensive medications.  Blood pressure elevated in office today at 140/90.  Will continue to monitor blood pressure.  If persistently elevated will discuss the need for medication with the patient.  Orders:    CBC and differential; Future    Comprehensive metabolic panel; Future

## 2024-10-22 NOTE — ASSESSMENT & PLAN NOTE
Patient has history of hyperlipidemia and father with extensive coronary artery disease.  Patient has previously been prescribed rosuvastatin 10 mg daily.  However, he states that he does not ever remember starting this medication.  No recent blood work.  Will get lipid panel to investigate cholesterol levels and ASCVD risk score.  If indicated we will discuss treatment with patient.  Orders:    Lipid panel; Future
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
## 2024-10-22 NOTE — LETTER
October 22, 2024     Patient: Janes Bryan  YOB: 1985  Date of Visit: 10/22/2024      To Whom it May Concern:    Janes Bryan is under my professional care. Janes was seen in my office on 10/22/2024. Janes may return to work on 10/23/2024 .    If you have any questions or concerns, please don't hesitate to call.         Sincerely,          Morales Elmore,         CC: No Recipients